# Patient Record
Sex: MALE | Race: WHITE | Employment: UNEMPLOYED | URBAN - METROPOLITAN AREA
[De-identification: names, ages, dates, MRNs, and addresses within clinical notes are randomized per-mention and may not be internally consistent; named-entity substitution may affect disease eponyms.]

---

## 2021-04-03 ENCOUNTER — APPOINTMENT (OUTPATIENT)
Dept: GENERAL RADIOLOGY | Age: 46
End: 2021-04-03
Payer: MEDICAID

## 2021-04-03 ENCOUNTER — HOSPITAL ENCOUNTER (EMERGENCY)
Age: 46
Discharge: HOME | End: 2021-04-03
Attending: EMERGENCY MEDICINE
Payer: MEDICAID

## 2021-04-03 VITALS
SYSTOLIC BLOOD PRESSURE: 140 MMHG | DIASTOLIC BLOOD PRESSURE: 102 MMHG | HEART RATE: 91 BPM | RESPIRATION RATE: 18 BRPM | OXYGEN SATURATION: 98 % | HEIGHT: 68 IN | WEIGHT: 260 LBS | TEMPERATURE: 97.8 F | BODY MASS INDEX: 39.4 KG/M2

## 2021-04-03 DIAGNOSIS — Z20.822 COVID-19 VIRUS TEST RESULT UNKNOWN: ICD-10-CM

## 2021-04-03 DIAGNOSIS — R05.9 COUGH: Primary | ICD-10-CM

## 2021-04-03 DIAGNOSIS — B34.9 VIRAL ILLNESS: ICD-10-CM

## 2021-04-03 DIAGNOSIS — R73.9 HYPERGLYCEMIA: ICD-10-CM

## 2021-04-03 LAB
ANION GAP SERPL CALCULATED.3IONS-SCNC: 13 MEQ/L (ref 9–15)
BACTERIA: NEGATIVE /HPF
BASOPHILS ABSOLUTE: 0.1 K/UL (ref 0–0.2)
BASOPHILS RELATIVE PERCENT: 0.8 %
BILIRUBIN URINE: ABNORMAL
BLOOD, URINE: NEGATIVE
BUN BLDV-MCNC: 10 MG/DL (ref 6–20)
CALCIUM SERPL-MCNC: 10.2 MG/DL (ref 8.5–9.9)
CHLORIDE BLD-SCNC: 101 MEQ/L (ref 95–107)
CLARITY: CLEAR
CO2: 24 MEQ/L (ref 20–31)
COLOR: ABNORMAL
CREAT SERPL-MCNC: 0.96 MG/DL (ref 0.7–1.2)
EKG ATRIAL RATE: 72 BPM
EKG P AXIS: 23 DEGREES
EKG P-R INTERVAL: 174 MS
EKG Q-T INTERVAL: 406 MS
EKG QRS DURATION: 90 MS
EKG QTC CALCULATION (BAZETT): 444 MS
EKG R AXIS: -41 DEGREES
EKG T AXIS: 38 DEGREES
EKG VENTRICULAR RATE: 72 BPM
EOSINOPHILS ABSOLUTE: 0.1 K/UL (ref 0–0.7)
EOSINOPHILS RELATIVE PERCENT: 1.2 %
EPITHELIAL CELLS, UA: NORMAL /HPF
GFR AFRICAN AMERICAN: >60
GFR NON-AFRICAN AMERICAN: >60
GLUCOSE BLD-MCNC: 115 MG/DL (ref 60–115)
GLUCOSE BLD-MCNC: 223 MG/DL (ref 70–99)
GLUCOSE URINE: NEGATIVE MG/DL
HBA1C MFR BLD: 7.5 % (ref 4.8–5.9)
HCT VFR BLD CALC: 50.1 % (ref 42–52)
HEMOGLOBIN: 16.8 G/DL (ref 14–18)
HYALINE CASTS: NORMAL /LPF (ref 0–5)
INFLUENZA A BY PCR: NEGATIVE
INFLUENZA B BY PCR: NEGATIVE
KETONES, URINE: 15 MG/DL
LACTIC ACID: 1.8 MMOL/L (ref 0.5–2.2)
LEUKOCYTE ESTERASE, URINE: NEGATIVE
LYMPHOCYTES ABSOLUTE: 1.8 K/UL (ref 1–4.8)
LYMPHOCYTES RELATIVE PERCENT: 26.2 %
MCH RBC QN AUTO: 31.9 PG (ref 27–31.3)
MCHC RBC AUTO-ENTMCNC: 33.6 % (ref 33–37)
MCV RBC AUTO: 95 FL (ref 80–100)
MONOCYTES ABSOLUTE: 0.6 K/UL (ref 0.2–0.8)
MONOCYTES RELATIVE PERCENT: 8.7 %
MUCUS: PRESENT /LPF
NEUTROPHILS ABSOLUTE: 4.4 K/UL (ref 1.4–6.5)
NEUTROPHILS RELATIVE PERCENT: 63.1 %
NITRITE, URINE: NEGATIVE
PDW BLD-RTO: 14 % (ref 11.5–14.5)
PERFORMED ON: NORMAL
PH UA: 7 (ref 5–9)
PLATELET # BLD: 190 K/UL (ref 130–400)
POTASSIUM SERPL-SCNC: 3.9 MEQ/L (ref 3.4–4.9)
PROTEIN UA: 30 MG/DL
RBC # BLD: 5.27 M/UL (ref 4.7–6.1)
RBC UA: NORMAL /HPF (ref 0–2)
SODIUM BLD-SCNC: 138 MEQ/L (ref 135–144)
SPECIFIC GRAVITY UA: 1.02 (ref 1–1.03)
TROPONIN: <0.01 NG/ML (ref 0–0.01)
URINE REFLEX TO CULTURE: ABNORMAL
UROBILINOGEN, URINE: >=8 E.U./DL
WBC # BLD: 6.9 K/UL (ref 4.8–10.8)
WBC UA: NORMAL /HPF (ref 0–5)

## 2021-04-03 PROCEDURE — 2580000003 HC RX 258: Performed by: EMERGENCY MEDICINE

## 2021-04-03 PROCEDURE — 6360000002 HC RX W HCPCS: Performed by: EMERGENCY MEDICINE

## 2021-04-03 PROCEDURE — 71045 X-RAY EXAM CHEST 1 VIEW: CPT

## 2021-04-03 PROCEDURE — 96374 THER/PROPH/DIAG INJ IV PUSH: CPT

## 2021-04-03 PROCEDURE — 83605 ASSAY OF LACTIC ACID: CPT

## 2021-04-03 PROCEDURE — 96375 TX/PRO/DX INJ NEW DRUG ADDON: CPT

## 2021-04-03 PROCEDURE — 80048 BASIC METABOLIC PNL TOTAL CA: CPT

## 2021-04-03 PROCEDURE — 87635 SARS-COV-2 COVID-19 AMP PRB: CPT

## 2021-04-03 PROCEDURE — 6370000000 HC RX 637 (ALT 250 FOR IP): Performed by: EMERGENCY MEDICINE

## 2021-04-03 PROCEDURE — 83036 HEMOGLOBIN GLYCOSYLATED A1C: CPT

## 2021-04-03 PROCEDURE — 87502 INFLUENZA DNA AMP PROBE: CPT

## 2021-04-03 PROCEDURE — 85025 COMPLETE CBC W/AUTO DIFF WBC: CPT

## 2021-04-03 PROCEDURE — 99283 EMERGENCY DEPT VISIT LOW MDM: CPT

## 2021-04-03 PROCEDURE — 84484 ASSAY OF TROPONIN QUANT: CPT

## 2021-04-03 PROCEDURE — 81001 URINALYSIS AUTO W/SCOPE: CPT

## 2021-04-03 PROCEDURE — 36415 COLL VENOUS BLD VENIPUNCTURE: CPT

## 2021-04-03 PROCEDURE — 93005 ELECTROCARDIOGRAM TRACING: CPT

## 2021-04-03 RX ORDER — ALPRAZOLAM 1 MG/1
1 TABLET ORAL NIGHTLY PRN
COMMUNITY

## 2021-04-03 RX ORDER — ATORVASTATIN CALCIUM 40 MG/1
40 TABLET, FILM COATED ORAL DAILY
COMMUNITY

## 2021-04-03 RX ORDER — FLUOXETINE HYDROCHLORIDE 20 MG/1
40 CAPSULE ORAL 2 TIMES DAILY
COMMUNITY

## 2021-04-03 RX ORDER — 0.9 % SODIUM CHLORIDE 0.9 %
1000 INTRAVENOUS SOLUTION INTRAVENOUS ONCE
Status: COMPLETED | OUTPATIENT
Start: 2021-04-03 | End: 2021-04-03

## 2021-04-03 RX ORDER — ONDANSETRON 2 MG/ML
4 INJECTION INTRAMUSCULAR; INTRAVENOUS ONCE
Status: COMPLETED | OUTPATIENT
Start: 2021-04-03 | End: 2021-04-03

## 2021-04-03 RX ORDER — QUETIAPINE FUMARATE 200 MG/1
400 TABLET, FILM COATED ORAL 2 TIMES DAILY
COMMUNITY

## 2021-04-03 RX ORDER — CLONIDINE HYDROCHLORIDE 0.1 MG/1
0.3 TABLET ORAL DAILY
COMMUNITY

## 2021-04-03 RX ORDER — MULTIVIT-MIN/IRON/FOLIC ACID/K 18-600-40
2000 CAPSULE ORAL
COMMUNITY

## 2021-04-03 RX ADMIN — SODIUM CHLORIDE 1000 ML: 9 INJECTION, SOLUTION INTRAVENOUS at 09:26

## 2021-04-03 RX ADMIN — ONDANSETRON 4 MG: 2 INJECTION INTRAMUSCULAR; INTRAVENOUS at 09:27

## 2021-04-03 RX ADMIN — INSULIN HUMAN 10 UNITS: 100 INJECTION, SOLUTION PARENTERAL at 11:49

## 2021-04-03 ASSESSMENT — ENCOUNTER SYMPTOMS
SORE THROAT: 0
VOMITING: 0
COUGH: 0
ABDOMINAL PAIN: 0
EYE REDNESS: 0
NAUSEA: 0
SHORTNESS OF BREATH: 0
EYE DISCHARGE: 0
BACK PAIN: 0

## 2021-04-03 ASSESSMENT — PAIN DESCRIPTION - PAIN TYPE: TYPE: ACUTE PAIN

## 2021-04-03 ASSESSMENT — PAIN SCALES - GENERAL: PAINLEVEL_OUTOF10: 3

## 2021-04-03 ASSESSMENT — PAIN DESCRIPTION - LOCATION: LOCATION: OTHER (COMMENT)

## 2021-04-03 NOTE — ED TRIAGE NOTES
Pt presents to the ED, from home, via personal vehicle. Pt states he \"usually sweats\" but has concerns re: his recent excessive sweating. Pt admits to a dry cough (pt is a smoker), low grade fever and fatigue. Pt also voices concern re: possible Covid, as he lives with his elderly father who has health issues.

## 2021-04-03 NOTE — ED NOTES
Pt is given d/c instructions, no scripts. Pt is encouraged to find a PCP to f/u on blood sugars. Pt voiced understanding of d/c instructions without further questions.       Kaylin Kaufman RN  04/03/21 [id number]

## 2021-04-04 LAB — SARS-COV-2, PCR: NOT DETECTED

## 2021-04-05 PROCEDURE — 93010 ELECTROCARDIOGRAM REPORT: CPT | Performed by: INTERNAL MEDICINE

## 2021-04-21 ENCOUNTER — TELEPHONE (OUTPATIENT)
Dept: INFECTIOUS DISEASES | Age: 46
End: 2021-04-21

## 2021-05-07 ENCOUNTER — VIRTUAL VISIT (OUTPATIENT)
Dept: INFECTIOUS DISEASES | Age: 46
End: 2021-05-07
Payer: MEDICAID

## 2021-05-07 DIAGNOSIS — R78.81 MSSA BACTEREMIA: Primary | ICD-10-CM

## 2021-05-07 DIAGNOSIS — F12.90 MARIJUANA USE: ICD-10-CM

## 2021-05-07 DIAGNOSIS — Z72.0 TOBACCO USE: ICD-10-CM

## 2021-05-07 DIAGNOSIS — B95.61 MSSA BACTEREMIA: Primary | ICD-10-CM

## 2021-05-07 DIAGNOSIS — F42.4 SKIN PICKING HABIT: ICD-10-CM

## 2021-05-07 DIAGNOSIS — R07.89 LEFT-SIDED CHEST WALL PAIN: ICD-10-CM

## 2021-05-07 PROCEDURE — 99214 OFFICE O/P EST MOD 30 MIN: CPT | Performed by: INTERNAL MEDICINE

## 2021-05-07 NOTE — PROGRESS NOTES
2021    TELEHEALTH EVALUATION -- Audio/Visual (During YWGHX-23 public health emergency)      Kathleen Oliver (:  1975) has requested an audio/video evaluation for the following concern(s):    Bacteremia    Due to the COVID-19 outbreak, patient's visit was converted to a virtual visit. Patient agreed to proceed with a virtual visit with me via Doxy. me with my location at the office. Patient reports their location. The risks and benefits of converting to a virtual visit were discussed in light of the current infectious disease epidemic. Services were provided through an audio/video synchronous discussion virtually to substitute for in person clinic visit. THIS virtual visit was conducted via interactive/real-time audio/video. Due to this being a TeleHealth encounter, evaluation of the following organ systems is limited: Vitals/Constitutional/EENT/Resp/CV/GI//MS/Neuro/Skin/Heme-Lymph-Imm.}    Pursuant to the emergency declaration under the [address] [place] [address], [facility], [facility] waiver authority and the [employer] Act, this Virtual Visit was conducted, with patient's consent, to reduce the patient's risk of exposure to COVID-19 and provide care for the patient. Infectious Disease Progress Note       Patient is a followup regarding staph bacteremia. Had chest pressure and pain reproducible with palpation L shoulder. + everyday smoker and THC use. Treated with IV Vanco then Ancef inpatient and discharged on PO abx. MSSA. L chest costochondritis. Has skin picking habit - had picked a large scab on abdomen prior to hospitalization which was likely the source. Has a little sore throat today but otherwise feels ok. No fevers or chills.          Lab Results   Component Value Date    WBC 6.9 2021    HGB 16.8 2021    HCT 50.1 2021    MCV 95.0 2021     2021     Lab Results   Component Value Date  04/03/2021    K 3.9 04/03/2021     04/03/2021    CO2 24 04/03/2021    BUN 10 04/03/2021    CREATININE 0.96 04/03/2021    GLUCOSE 223 04/03/2021    CALCIUM 10.2 04/03/2021        WBC trends are being monitored. Antibiotic doses are being adjusted per most recent renal labs. Since we cannot conduct an in-person exam, the following were addressed with the patient to the best of my capability via virtual visit:   Patient does not perceive any new visual deficits  No diaphoresis or flushing in the face  Patient is able to flex and extend her neck with ease. Patient can inhale and exhale without any difficulty and chest seems to be expanding symmetrically. No conversational dyspnea. Patient does not feel any palpitations   Patient's  abdomen is not protruberant beyond their normal size. No new swelling of their joints. No observed neurological changes or slurred speech when speaking to the patient    No new skin rash or ulcers. The area that had been picked in the hospital over the abd wall has healed. No new scabs that look prominent. Looked in the patient's throat and no erythema that I can see. Overall looks ok. Obese male. ASSESSMENT:  MSSA bacteremia  L chest wall pain/ costochondritis  Skin picking habit. Tobacco and THC use    PLAN:  Bacteremia treated with longterm abx. If there is any evidence of fever or chills, patient is to call the office. Discouraged from picking at his scabs and skin  Encouraged not to smoke cigarettes and/or THC smoking.      Imaging and labs were reviewed per medical records and any ID pertinent labs were also addressed  Time spent today in combination of reviewing patient's chart, medications, labs, pictures when pertinent, provider communication as necessary, counseling patient, care/coordination not otherwise reported here, and patient face to face virtual visit 30min.   >50% of that time spent counseling and coordination of patient care  Addressed preventive measures such as vaccinations, the importance of annual exam with the PCP, and the importance of health maintenance by dietary and exercise regimens. All questions were answered from an ID perspective and to the best of my ability. Social distancing measures, the importance of face masks that properly cover the nose and mouth in public, and proper hand hygiene will continue to be addressed    Risks and benefits of ID prescribed medications were discussed with patient or supporting staff caring for the patient as appropriate and feedback obtained.      Becki Arceo, DO
